# Patient Record
Sex: FEMALE | Race: WHITE | Employment: FULL TIME | URBAN - METROPOLITAN AREA
[De-identification: names, ages, dates, MRNs, and addresses within clinical notes are randomized per-mention and may not be internally consistent; named-entity substitution may affect disease eponyms.]

---

## 2021-07-20 LAB — MAMMOGRAPHY, EXTERNAL: NORMAL

## 2021-12-16 ENCOUNTER — HOSPITAL ENCOUNTER (OUTPATIENT)
Dept: GENERAL RADIOLOGY | Age: 56
Discharge: HOME | End: 2021-12-16
Payer: COMMERCIAL

## 2021-12-16 DIAGNOSIS — J44.9 CHRONIC OBSTRUCTIVE PULMONARY DISEASE, UNSPECIFIED COPD TYPE (HCC): ICD-10-CM

## 2021-12-16 PROCEDURE — 71046 X-RAY EXAM CHEST 2 VIEWS: CPT

## 2022-01-21 ENCOUNTER — HOSPITAL ENCOUNTER (OUTPATIENT)
Dept: SLEEP MEDICINE | Age: 57
Discharge: HOME | End: 2022-01-21
Payer: COMMERCIAL

## 2022-01-21 PROCEDURE — 95806 SLEEP STUDY UNATT&RESP EFFT: CPT

## 2022-02-08 PROBLEM — G47.33 OSA (OBSTRUCTIVE SLEEP APNEA): Status: ACTIVE | Noted: 2022-02-08

## 2022-02-08 PROBLEM — G47.34 NOCTURNAL HYPOXEMIA: Status: ACTIVE | Noted: 2022-02-08

## 2022-02-08 PROBLEM — G47.10 HYPERSOMNIA: Status: ACTIVE | Noted: 2022-02-08

## 2022-02-08 PROBLEM — G47.8 NON-RESTORATIVE SLEEP: Status: ACTIVE | Noted: 2022-02-08

## 2022-03-18 PROBLEM — G47.10 HYPERSOMNIA: Status: ACTIVE | Noted: 2022-02-08

## 2022-03-19 PROBLEM — G47.34 NOCTURNAL HYPOXEMIA: Status: ACTIVE | Noted: 2022-02-08

## 2022-03-19 PROBLEM — G47.8 NON-RESTORATIVE SLEEP: Status: ACTIVE | Noted: 2022-02-08

## 2022-03-20 PROBLEM — G47.33 OSA (OBSTRUCTIVE SLEEP APNEA): Status: ACTIVE | Noted: 2022-02-08

## 2022-06-10 NOTE — PROGRESS NOTES
[address] [place] [address] Court, [address] [facility]  [phone]    Patient Name:  Emile Francisco  YOB: 1965    Pursuant to the emergency declaration under the [facility] waiver authority and the [employer] and The [employer] Act, this Virtual  Visit was conducted, with patient's consent, to reduce the patient's risk of exposure to COVID-19 and provide continuity of care for an established patient. Telehealth encounter is a billable service, with coverage as determined by the insurance carrier. Services were provided through a video synchronous discussion virtually to substitute for in-person clinic visit. Arnav Sheets is a 60 y.o. female who was seen by synchronous (real-time) audio-video technology on 6/14/2022. Consent:  She and/or her healthcare decision maker is aware that this patient-initiated Telehealth encounter is a billable service, with coverage as determined by her insurance carrier. She is aware that she may receive a bill and has provided verbal consent to proceed: Yes        Office Visit 6/14/2022    CHIEF COMPLAINT:    Chief Complaint   Patient presents with    Sleep Apnea       HISTORY OF PRESENT ILLNESS:  Patient is being seen today via virtual visit. Patient was diagnosed with overall mild sleep apnea with AHI 5.7/hr with desaturations to 84%. She was started on APAP 6-10 cm. She demonstrates 69% compliance on therapy with average nightly use 4 hrs 49 min. AHI is down to 1.4/hr. Some nights, she is able to wear the machine 8 hrs and feel more rested the next day. However, there are nights that she can only wear it for 2 hours and have to take it off because of tolerance. She is using the F30i mask. We discussed changing to a nasal mask and she would like this. She denies any problems with the pressure. Will provide sample nasal mask to try.     She notices that her breathing is better on nights she can use her CPAP all night. She is not using albuterol regularly and didn't notice any improvement with using it. Maite Oquendo, GILES, had ordered a TSH but this has not been done. I do not see it crossover to the new Epic. I will reorder this today. Past Medical History:   Diagnosis Date    Autoimmune disease ([address]     hepatitis    GERD (gastroesophageal reflux disease)     no meds - no problems for several years now    Murmur     as an infant - no problems since    Nausea & vomiting     SOB (shortness of breath)     seasonal allergy related - no asthma dx       Patient Active Problem List   Diagnosis    Hypersomnia    Nocturnal hypoxemia    Non-restorative sleep    LAMAR (obstructive sleep apnea)           Past Surgical History:   Procedure Laterality Date    OTHER SURGICAL HISTORY  7/2009    lipoma removed from back           Social History     Socioeconomic History    Marital status:      Spouse name: Not on file    Number of children: Not on file    Years of education: Not on file    Highest education level: Not on file   Occupational History    Not on file   Tobacco Use    Smoking status: Never Smoker    Smokeless tobacco: Never Used   Substance and Sexual Activity    Alcohol use: No    Drug use: No    Sexual activity: Not on file   Other Topics Concern    Not on file   Social History Narrative    Not on file     Social Determinants of Health     Financial Resource Strain:     Difficulty of Paying Living Expenses: Not on file   Food Insecurity:     Worried About Running Out of Food in the Last Year: Not on file    Sergio of Food in the Last Year: Not on file   Transportation Needs:     Lack of Transportation (Medical): Not on file    Lack of Transportation (Non-Medical):  Not on file   Physical Activity:     Days of Exercise per Week: Not on file    Minutes of Exercise per Session: Not on file   Stress:     Feeling of Stress : Not on file   Social Connections:     Frequency of Communication with Friends and Family: Not on file    Frequency of Social Gatherings with Friends and Family: Not on file    Attends [employer] Services: Not on file    Active Member of Clubs or Organizations: Not on file    Attends Club or Organization Meetings: Not on file    Marital Status: Not on file   Intimate Partner Violence:     Fear of Current or Ex-Partner: Not on file    Emotionally Abused: Not on file    Physically Abused: Not on file    Sexually Abused: Not on file   Housing Stability:     Unable to Pay for Housing in the Last Year: Not on file    Number of [place] in the Last Year: Not on file    Unstable Housing in the Last Year: Not on file         Family History   Problem Relation Age of Onset    Asthma Father     Stroke Mother         Juluis Slocumb Hypertension Mother     Diabetes Mother     Cancer Father         multiple myeloma - prostate         Allergies   Allergen Reactions    Penicillins Rash     allergic since child         Current Outpatient Medications   Medication Sig    estrogens, conjugated,-methylTESTOSTERone (ESTRATEST) 1.25-2.5 MG per tablet Take 1 tablet by mouth.  furosemide (LASIX) 20 MG tablet Take by mouth daily    albuterol sulfate  (90 Base) MCG/ACT inhaler Inhale 2 puffs into the lungs every 4 hours as needed (Patient not taking: Reported on 6/14/2022)     No current facility-administered medications for this visit. REVIEW OF SYSTEMS:   CONSTITUTIONAL:   There is no history of fever, chills, night sweats. CARDIAC:   No chest pain, pressure, discomfort, palpitations, orthopnea, murmurs, or edema. GI:   No dysphagia, heartburn reflux, nausea/vomiting, diarrhea, abdominal pain, or bleeding. NEURO:   There is no history of AMS, persistent headache, decreased level of consciousness, seizures, or motor or sensory deficits.     VIRTUAL EXAM  PHYSICAL EXAMINATION:  [ INSTRUCTIONS:  \"[x]\" Indicates a positive item  \"[]\" Indicates a negative item   Vital Signs: (As obtained by patient/caregiver at home)  There were no vitals taken for this visit. Constitutional: [x] Appears well-developed and well-nourished [x] No apparent distress      [] Abnormal     Mental status: [x] Alert and awake  [x] Oriented to person/place/time [x] Able to follow commands    [] Abnormal -     Eyes:   EOM    [x]  Normal    [] Abnormal -   Sclera  [x]  Normal    [] Abnormal -          Discharge [x]  None visible   [] Abnormal -    HENT: [x] Normocephalic, atraumatic  [] Abnormal -  [x] Mouth/Throat: Mucous membranes are moist    External Ears [x] Normal  [] Abnormal -    Neck: [x] No visualized mass [] Abnormal -     Pulmonary/Chest: [x] Respiratory effort normal   [x] No visualized signs of difficulty breathing or respiratory distress        [] Abnormal -      Musculoskeletal:   [x] Normal gait with no signs of ataxia         [x] Normal range of motion of neck        [] Abnormal -     Neurological:        [x] No Facial Asymmetry (Cranial nerve 7 motor function) (limited exam due to video visit)          [x] No gaze palsy        [] Abnormal -         Skin:        [x] No significant exanthematous lesions or discoloration noted on facial skin         [] Abnormal -            Psychiatric:       [x] Normal Affect [] Abnormal -       [x] No Hallucinations    Other pertinent observable physical exam findings:-      ASSESSMENT:  (Medical Decision Making)      Diagnosis Orders   1. LAMAR (obstructive sleep apnea) AHI controlled on therapy but patient having difficulty tolerating mask. Will trial nasal mask.     2. Edema, unspecified type - check TSH  TSH        PLAN:  Continue APAP settings  Sample nasal mask will be provided  Will reorder TSH- she will have this drawn Friday when she comes for the nasal mask pickup  Follow up in 4 months or sooner if needed   Orders Placed This Encounter Procedures    TSH     Standing Status:   Future     Standing Expiration Date:   6/14/2023     No orders of the defined types were placed in this encounter. Collaborating Physician: Dr. Nicole Velasquez      I spent at least 20 minutes with this established patient, and >50% of the time was spent counseling and/or coordinating care regarding manan.     ALINA Price - SULEMA  Electronically signed

## 2022-06-14 ENCOUNTER — TELEMEDICINE (OUTPATIENT)
Dept: SLEEP MEDICINE | Age: 57
End: 2022-06-14
Payer: COMMERCIAL

## 2022-06-14 DIAGNOSIS — G47.33 OSA (OBSTRUCTIVE SLEEP APNEA): Primary | ICD-10-CM

## 2022-06-14 DIAGNOSIS — R60.9 EDEMA, UNSPECIFIED TYPE: ICD-10-CM

## 2022-06-14 PROCEDURE — 99213 OFFICE O/P EST LOW 20 MIN: CPT | Performed by: NURSE PRACTITIONER

## 2022-06-14 ASSESSMENT — SLEEP AND FATIGUE QUESTIONNAIRES
HOW LIKELY ARE YOU TO NOD OFF OR FALL ASLEEP WHILE SITTING AND READING: 3
HOW LIKELY ARE YOU TO NOD OFF OR FALL ASLEEP WHILE SITTING INACTIVE IN A PUBLIC PLACE: 0
HOW LIKELY ARE YOU TO NOD OFF OR FALL ASLEEP IN A CAR, WHILE STOPPED FOR A FEW MINUTES IN TRAFFIC: 0
HOW LIKELY ARE YOU TO NOD OFF OR FALL ASLEEP WHILE WATCHING TV: 2
HOW LIKELY ARE YOU TO NOD OFF OR FALL ASLEEP WHEN YOU ARE A PASSENGER IN A CAR FOR AN HOUR WITHOUT A BREAK: 2
HOW LIKELY ARE YOU TO NOD OFF OR FALL ASLEEP WHILE LYING DOWN TO REST IN THE AFTERNOON WHEN CIRCUMSTANCES PERMIT: 2
HOW LIKELY ARE YOU TO NOD OFF OR FALL ASLEEP WHILE SITTING AND TALKING TO SOMEONE: 0
HOW LIKELY ARE YOU TO NOD OFF OR FALL ASLEEP WHILE SITTING QUIETLY AFTER LUNCH WITHOUT ALCOHOL: 2
ESS TOTAL SCORE: 11

## 2022-06-17 DIAGNOSIS — R60.9 EDEMA, UNSPECIFIED TYPE: ICD-10-CM

## 2022-06-17 LAB — TSH, 3RD GENERATION: 1.89 UIU/ML (ref 0.36–3.74)

## 2022-06-20 ENCOUNTER — TELEPHONE (OUTPATIENT)
Dept: SLEEP MEDICINE | Age: 57
End: 2022-06-20

## 2022-06-20 NOTE — TELEPHONE ENCOUNTER
----- Message from Vikki Brady, ALINA - CNP sent at 6/17/2022  4:06 PM EDT -----  Please let patient know TSH was normal.

## 2022-07-22 ENCOUNTER — OFFICE VISIT (OUTPATIENT)
Dept: PULMONOLOGY | Age: 57
End: 2022-07-22
Payer: COMMERCIAL

## 2022-07-22 VITALS
RESPIRATION RATE: 17 BRPM | BODY MASS INDEX: 29.94 KG/M2 | WEIGHT: 186.3 LBS | HEIGHT: 66 IN | TEMPERATURE: 97 F | SYSTOLIC BLOOD PRESSURE: 116 MMHG | OXYGEN SATURATION: 98 % | DIASTOLIC BLOOD PRESSURE: 72 MMHG | HEART RATE: 52 BPM

## 2022-07-22 DIAGNOSIS — G47.33 OSA ON CPAP: ICD-10-CM

## 2022-07-22 DIAGNOSIS — R00.1 BRADYCARDIA: ICD-10-CM

## 2022-07-22 DIAGNOSIS — Z99.89 OSA ON CPAP: ICD-10-CM

## 2022-07-22 DIAGNOSIS — R60.0 LOWER EXTREMITY EDEMA: ICD-10-CM

## 2022-07-22 DIAGNOSIS — R06.2 WHEEZING: Primary | ICD-10-CM

## 2022-07-22 PROCEDURE — 99214 OFFICE O/P EST MOD 30 MIN: CPT | Performed by: NURSE PRACTITIONER

## 2022-07-22 NOTE — PROGRESS NOTES
[address], [address] [place] [address]   [phone]      Patient Name:  Royce Francisco      YOB: 1965      Office Visit 3/23/2022      HISTORY OF PRESENT ILLNESS:     Ms. Lola Slaughter in our clinic today who presents with a chief complaint of   Chief Complaint   Patient presents with    Wheezing          Josemanuel Hussein is a 59 yo female that presents to [facility] as a new patient for concerns of wheezing and COPD. She states her OBGYN sent her for CXR due to her wheezing and  the radiologist dictated concerns for COPD, so she was sent for evaluation. She is a lifetime non smoker. She denies any issues with her lungs until the last few years in which she has started wheezing. She states she noticed the wheezing when she notices that her legs are more swollen. If she takes a Lasix tablet, swelling  and wheezing seem to ease up. She has never been given an inhaler for the wheezing. As for the swelling. She states she started noticing this about 10 to 12 years ago. She was at her GYN office and it was noted that she had significant edema in her lower extremities. Her GYN ordered her Lasix and referred her to cardiology for evaluation. She had a stress test, echocardiogram and wore an event monitor. Her stress test was normal.  Her echocardiogram showed no significant valvular dysfunction, but did notice some mild left ventricular hypertrophy. Her EF was normal.  Her event monitor  did show some sinus bradycardia, heart rate in the 30s to 50s with intermittent PSVT as well. She denies any significant history of hypertension. Patient states she does have a history of autoimmune   Hepatitis. She was on Imuran for about 9 years, but this was recently stopped by Dr. Love Summers. Patient mentions that she has had significant weight fluctuations because of her swelling.   She is also told by her wife that she has significant snoring. She states her wife has never told her that she quits breathing when she sleeps. She states she  does not feel well rested and she does have some significant daytime hypersomnolence. She mentions that she had a sleep study about 10 to 12 years ago, but was never given results of this and never followed up. Patient has a significant family history for lung disorders   With her mother and father. They were both significant smokers. Father had prostate cancer, multiple myeloma and asthma as a young child. Her mother had lung cancer, COPD, heart failure and arrhythmias. She does have a cat. ASSESSMENT AND PLAN:  (Medical Decision Making)      Diagnoses and all orders for this visit:      1. Wheezing   --CPFT's with very mild airway obstruction noted. No significant response to albuterol. -- Improved wheezing and shortness of breath since being on CPAP as well as as needed Lasix. --pt has been very bradycardic as well; seeing cardiology and potential plans for pacemaker, but currently being monitored. 2. Obstructive sleep apnea   --AHI on home sleep testing 5.7. She is currently using his CPAP, although not consistent yet. She states she is trying to get used to this. Notices benefit when she wears it for several hours. 3. Edema of both lower extremities   --Patient can continue the Lasix is currently taking and prescribed. --TSH normal      4. Bradycardia  --HR during exam in 40's. Recent echo normal.  With fatigue, edema issues, pt encouraged to talk with cardiologist again regarding potential for pacemaker vs medication that could increase HR. At this point no ongoing concerns with her pulmonary status. With very mild obstruction noted on her spirometry, suspect she may have some mild reactive airway disease, so if seasonal or environmental allergies seem to peak, patient to use albuterol inhaler if needed.   If consistently using, she should make an appointment and follow back up with us. Otherwise she can call us as needed        Logan Zhou NP   Electronically signed      AD     Collaborating physician is Ailson Mcduffie MD        _________________________________________________________________________            REVIEW OF SYSTEMS:   10 point review of systems is negative except as reported in HPI. PHYSICAL EXAM:  Vitals:    07/22/22 0844   BP: 116/72   Pulse: 52   Resp: 17   Temp: 97 °F (36.1 °C)   TempSrc: Skin   SpO2: 98%   Weight: 186 lb 4.8 oz (84.5 kg)   Height: 5' 6\" (1.676 m)          GENERAL APPEARANCE:  The patient is obese and in no respiratory distress. HEENT:  PERRL. Conjunctivae unremarkable. Nasal mucosa is without epistaxis, exudate, or polyps. There is oropharyngeal narrowing. TMs  are clear. NECK/LYMPHATIC:  Symmetrical with no JVD. Trachea midline. No thyroid enlargement. No cervical adenopathy. LUNGS:  Normal respiratory effort with symmetrical lung expansion. Breath sounds clear bilaterally on RA. HEART:  There is a regular rate and rhythm. + murmur, no rub, or gallop. There is 1+ edema in the lower extremities. ABDOMEN:  Soft and non-tender. No hepatosplenomegaly. Bowel sounds are normal.     SKIN:  There are no rashes, cyanosis, jaundice, or ecchymosis present. EXTREMITIES:  The extremities are unremarkable without clubbing, cyanosis, joint inflammation, degenerative, or ischemic change. Hands with  poor cap refill and cool to the touch. MUSCULOSKELETAL:  There is no abnormal tone, muscle atrophy, or abnormal movement present. NEURO:  The patient is alert and oriented. Memory appears intact and mood is normal.  No gross sensorimotor deficits are present.          DIAGNOSTIC TESTS:                                                                                                              LABS: No results for input(s): HGB, HCT, TSH, BNPNT, HGBEXT, HCTEXT, TSHEXT, HGBEXT, HCTEXT, TSHEXT in the last 72 hours. CXR PA and lateral:  Results for orders placed during the hospital encounter of 03/17/15      XR CHEST PA LAT: 12/16/2021                   Exercise oximetry: Resting room air sat was 99% with heart rate of 56. During exertion, lowest oxygen saturation was 97% with highest heart rate at 73. No supplemental oxygen was required during this walk. Spirometry:           Date:     12/16/2021  3/4/22       FVC     2.81-79%  3.06-90%       FEV1     2.30-83%  2.25-90%       FEV1/FVC     82%  73%       FEF 25-75%     2.39-95%  1.71-66%       Bronchodilator Response       none       TLC       5.08-95%       RV       2.02-98%            DLCO       89%          Echo:2/11/2022   Narrative   Performed by [facility]   · The left ventricular systolic function is normal (55-65%). · Normal left ventricular diastolic function. · The right ventricular systolic function is normal.      Left Ventricle   The systolic function is normal (55-65%). Normal left ventricular diastolic function. Right Ventricle   Normal right ventricular cavity size. Normal right ventricular systolic function. RV tissue doppler velocity is  12.3 cm/s. RVSP: 20 mmHg. Left Atrium   The left atrium is normal in size. Right Atrium   The right atrium is normal in size. IVC/SVC   The Inferior Vena Cava is normal in size. Mitral Valve    The mitral valve is normal. No mitral valve stenosis. No mitral valve regurgitation. Tricuspid Valve   The tricuspid valve is normal. No tricuspid valve stenosis. No tricuspid valve regurgitation. Aortic Valve   The aortic  valve is normal. No aortic valve stenosis. No aortic valve regurgitation. Pulmonic Valve   The pulmonic valve is normal. No pulmonic valve stenosis. No pulmonic valve regurgitation. Pericardium   There is no pericardial effusion  present.           [facility] Reference Info: Exposure  History:   Second Hand Smoke Exposure: YES   Birds: NO   Asbestos: NO   TB: NO   Hot Tubs/Humidifier: NO   Organic/Inorganic Dusts: NO   Molds: NO   Occupation/Hobbies: retired teacher       Past Medical History:        Diagnosis  Date           Autoimmune disease ([address]            hepatitis           GERD (gastroesophageal reflux disease)            no meds - no problems for several years now           Murmur            as an infant - no problems since           Nausea & vomiting         SOB (shortness of breath)            seasonal allergy related - no asthma dx                 Tobacco Use: Low Risk           Smoking Tobacco Use: Never Smoker          Smokeless Tobacco Use: Never Used            Allergies        Allergen  Reactions           Pcn [Penicillins]  Rash             allergic since child            Current Outpatient Medications        Medication  Sig           albuterol (PROVENTIL HFA, VENTOLIN HFA, PROAIR HFA) 90 mcg/actuation inhaler  Take 2 Puffs by inhalation every four (4) hours as needed for Wheezing. furosemide (Lasix) 20 mg tablet  Take  by mouth daily. PRN for swelling       estrogens, conjugated,-methylTESTOSTERone (ESTRATEST) 1.25-2.5 mg per tablet  Take 1 Tab by mouth. naproxen (NAPROSYN) 500 mg tablet  Take 1 Tab by mouth two (2) times daily (with meals). As needed for knee pain (Patient not taking: Reported on 12/16/2021)            No current facility-administered medications for this visit.

## 2022-11-30 NOTE — PROGRESS NOTES
[address] [place] [address] Court, [address] [facility]  [phone]    Patient Name:  Claudeen Dials Foxx  YOB: 1965    Pursuant to the emergency declaration under the [address] [place] [address] waiver authority and the [employer] and [employer] Act, this Virtual  Visit was conducted, with patient's consent, to reduce the patient's risk of exposure to COVID-19 and provide continuity of care for an established patient. Telehealth encounter is a billable service, with coverage as determined by the insurance carrier. Services were provided through a video synchronous discussion virtually to substitute for in-person clinic visit. Kia Jefferson is a 59 y.o. female who was seen by synchronous (real-time) audio-video technology on 12/1/2022. Consent:  She and/or her healthcare decision maker is aware that this patient-initiated Telehealth encounter is a billable service, with coverage as determined by her insurance carrier. She is aware that she may receive a bill and has provided verbal consent to proceed: Yes        Office Visit 12/1/2022    CHIEF COMPLAINT:    Chief Complaint   Patient presents with    Sleep Apnea         HISTORY OF PRESENT ILLNESS:  Patient is being seen today via virtual visit. Patient had a home sleep test 1/21/22 with AHI 5.7/hr with desaturations to 84%. She is prescribed APAP 6-10 cm using a nasal mask. Download reveals 59% compliance over the past 169 days with average nightly use 4 hrs 30 min on days used. AHI is 1.5/hr on therapy. She notices a difference in sleep quality, more dreaming, and feeling more rested on days she can use her machine most of the night. There are many nights she feels \"tangled\" in the hose and takes it off early. She denies RLS symptoms but states that she turns to get comfortable in the night.  She is using the F&P May mask and this will be changed to Resmed N30i to keep the hose out of the way. She denies any significant weight changes with a current weight between 184-188 lbs. She had some edema this morning and took a dose of lasix. She reports only rarely requiring it. She hasn't had a follow up with cardiology yet to discuss bradycardia but will soon.                         Sleep Medicine 12/1/2022 6/14/2022   Sitting and reading 2 3   Watching TV 2 2   Sitting, inactive in a public place (e.g. a theatre or a meeting) 0 0   As a passenger in a car for an hour without a break 2 2   Lying down to rest in the afternoon when circumstances permit 2 2   Sitting and talking to someone 0 0   Sitting quietly after a lunch without alcohol 2 2   In a car, while stopped for a few minutes in traffic 0 0   Sidney Sleepiness Score 10 11         Past Medical History:   Diagnosis Date    Autoimmune disease ([facility] [address]     hepatitis    GERD (gastroesophageal reflux disease)     no meds - no problems for several years now    Murmur     as an infant - no problems since    Nausea & vomiting     SOB (shortness of breath)     seasonal allergy related - no asthma dx       Patient Active Problem List   Diagnosis    Hypersomnia    Nocturnal hypoxemia    LAMAR (obstructive sleep apnea)           Past Surgical History:   Procedure Laterality Date    OTHER SURGICAL HISTORY  7/2009    lipoma removed from back           Social History     Socioeconomic History    Marital status:      Spouse name: Not on file    Number of children: Not on file    Years of education: Not on file    Highest education level: Not on file   Occupational History    Not on file   Tobacco Use    Smoking status: Never    Smokeless tobacco: Never   Substance and Sexual Activity    Alcohol use: No    Drug use: No    Sexual activity: Not on file   Other Topics Concern    Not on file   Social History Narrative    Not on file     Social Determinants of Health     Financial Resource Strain: Not on file   Food Insecurity: Not on file   Transportation Needs: Not on file   Physical Activity: Not on file   Stress: Not on file   Social Connections: Not on file   Intimate Partner Violence: Not on file   Housing Stability: Not on file         Family History   Problem Relation Age of Onset    Asthma Father     Stroke Mother         TIA's    Hypertension Mother     Diabetes Mother     Cancer Father         multiple myeloma - prostate         Allergies   Allergen Reactions    Penicillins Rash     allergic since child         Current Outpatient Medications   Medication Sig    CPAP Machine MISC by Does not apply route    albuterol sulfate  (90 Base) MCG/ACT inhaler Inhale 2 puffs into the lungs every 4 hours as needed    estrogens, conjugated,-methylTESTOSTERone (ESTRATEST) 1.25-2.5 MG per tablet Take 1 tablet by mouth. furosemide (LASIX) 20 MG tablet Take by mouth daily     No current facility-administered medications for this visit. REVIEW OF SYSTEMS:   CONSTITUTIONAL:   There is no history of fever, chills, night sweats. CARDIAC:   No chest pain, pressure, discomfort, palpitations, orthopnea, murmurs. + intermittent edema. GI:   No dysphagia, heartburn reflux, nausea/vomiting, diarrhea, abdominal pain, or bleeding. NEURO:   There is no history of AMS, persistent headache, decreased level of consciousness, seizures, or motor or sensory deficits. VIRTUAL EXAM  PHYSICAL EXAMINATION:  [ INSTRUCTIONS:  \"[x]\" Indicates a positive item  \"[]\" Indicates a negative item   Vital Signs: (As obtained by patient/caregiver at home)  There were no vitals taken for this visit.      Constitutional: [x] Appears well-developed and well-nourished [x] No apparent distress      [] Abnormal     Mental status: [x] Alert and awake  [x] Oriented to person/place/time [x] Able to follow commands    [] Abnormal -     Eyes:   EOM    [x]  Normal    [] Abnormal -   Sclera  [x]  Normal    [] Abnormal -          Discharge [x]  None visible   [] Abnormal -    HENT: [x] Normocephalic, atraumatic  [] Abnormal -  [x] Mouth/Throat: Mucous membranes are moist    External Ears [x] Normal  [] Abnormal -    Neck: [x] No visualized mass [] Abnormal -     Pulmonary/Chest: [x] Respiratory effort normal   [x] No visualized signs of difficulty breathing or respiratory distress        [] Abnormal -      Musculoskeletal:   [x] Normal gait with no signs of ataxia         [x] Normal range of motion of neck        [] Abnormal -     Neurological:        [x] No Facial Asymmetry (Cranial nerve 7 motor function) (limited exam due to video visit)          [x] No gaze palsy        [] Abnormal -         Skin:        [x] No significant exanthematous lesions or discoloration noted on facial skin         [] Abnormal -            Psychiatric:       [x] Normal Affect [] Abnormal -       [x] No Hallucinations    Other pertinent observable physical exam findings:-      ASSESSMENT:  (Medical Decision Making)      Diagnosis Orders   1. LAMAR (obstructive sleep apnea)  DME - DURABLE MEDICAL EQUIPMENT   AHI well controlled on current auto cpap settings with AHI down to 1.5/hr. will change mask to help with tolerance. 2. Nocturnal hypoxemia  DME - DURABLE MEDICAL EQUIPMENT   Improved on pap therapy    3. Hypersomnia  DME - DURABLE MEDICAL EQUIPMENT   Notes improvement with this and restorative sleep when able to use CPAP all night         PLAN:  Continue APAP at current settings.   Change mask to N30i sample pack  Renew supplies  Follow up will be in 6 months or sooner if needed     Orders Placed This Encounter   Procedures    DME - C/ [address]  Change mask to Resmed N30i sample pack, please provide to patient    [address] [place]  Phone: [address] [address]  Dept: [phone]      Patient Name: Maria Esther Francisco  : 1965  Gender: female  Address: [address] [place] [id number]   Patient phone: [phone] (home)       Primary Insurance: Payor: Ronny Taylork / Plan: Jack Layton / Product Type: *No Product type* /   Subscriber ID: [id number] - (Zaira KIRK)      AMB Supply Order  Order Details     DME Location:   Order Date: 12/1/2022   There were no encounter diagnoses. (  X   )Supplies Needed       apap Machine   (     ) CPAP Unit  (     ) Auto CPAP Unit  (     ) BiLevel Unit  (     ) Auto BiLevel Unit  (     ) ASV        (     ) Bilevel ST      Length of need: 12 months    Pressure:  6-10 cmH20  EPR:     Starting Ramp Pressure:   cm H20  Ramp Time: min        Patient had a diagnostic Apnea Hypopnea Index (AHI) of :    *SUPPLIES* Replace all as needed, or per coverage guidelines     Masks Type:  (    ) -Full Face Mask (1 per 3 mon)  (    ) -Full Mask (1 per month) Interface/Cushion      ( x ) -Nasal Mask (1 per 3 mon)  ( x  ) - Nasal Mask (1 per month) Interface/Cushion  (  x   ) -Pillow (2 per mon)  (     ) -Kazspufio (1 per 6 mon)            Other Supplies:    (   X  )-Qubcsklu (1 per 6 mon)  (   X  )-Ouwhco Tubing (1 per 3 mon)  (   X  )- Disposable Filter (2 per mon)  (   x  )-Nvyppm Humidifier (1 per year)     (  x   )-Klbjxufod (sometimes used with Full Face Mask) (1 per 6 mos)  (    )-Tubing-without heat (1 per 3 mos)  (     )-Non-Disposable Filter (1 per 6 mos)  (  x   )-Water Chamber (1 per 6 mos)  (     )-Humidifier non-heated (1 per 5 yrs)      Signed Date: 12/1/2022  Electronically Signed By: ALINA Eubanks CNP  Electronically Dated:  12/1/2022       No orders of the defined types were placed in this encounter. Collaborating Physician: Dr. Sandy Avendaño I spent at least 20 minutes with this established patient, and >50% of the time was spent counseling and/or coordinating care regarding manan.     ALINA Eubanks - CNP  Electronically signed

## 2022-12-01 ENCOUNTER — TELEMEDICINE (OUTPATIENT)
Dept: SLEEP MEDICINE | Age: 57
End: 2022-12-01
Payer: COMMERCIAL

## 2022-12-01 DIAGNOSIS — G47.10 HYPERSOMNIA: ICD-10-CM

## 2022-12-01 DIAGNOSIS — G47.34 NOCTURNAL HYPOXEMIA: ICD-10-CM

## 2022-12-01 DIAGNOSIS — G47.33 OSA (OBSTRUCTIVE SLEEP APNEA): Primary | ICD-10-CM

## 2022-12-01 PROBLEM — G47.8 NON-RESTORATIVE SLEEP: Status: RESOLVED | Noted: 2022-02-08 | Resolved: 2022-12-01

## 2022-12-01 PROCEDURE — 99213 OFFICE O/P EST LOW 20 MIN: CPT | Performed by: NURSE PRACTITIONER

## 2022-12-01 ASSESSMENT — SLEEP AND FATIGUE QUESTIONNAIRES
HOW LIKELY ARE YOU TO NOD OFF OR FALL ASLEEP WHILE SITTING QUIETLY AFTER LUNCH WITHOUT ALCOHOL: 2
ESS TOTAL SCORE: 10
HOW LIKELY ARE YOU TO NOD OFF OR FALL ASLEEP WHILE WATCHING TV: 2
HOW LIKELY ARE YOU TO NOD OFF OR FALL ASLEEP WHILE SITTING INACTIVE IN A PUBLIC PLACE: 0
HOW LIKELY ARE YOU TO NOD OFF OR FALL ASLEEP WHILE SITTING AND READING: 2
HOW LIKELY ARE YOU TO NOD OFF OR FALL ASLEEP WHILE SITTING AND TALKING TO SOMEONE: 0
HOW LIKELY ARE YOU TO NOD OFF OR FALL ASLEEP WHILE LYING DOWN TO REST IN THE AFTERNOON WHEN CIRCUMSTANCES PERMIT: 2
HOW LIKELY ARE YOU TO NOD OFF OR FALL ASLEEP IN A CAR, WHILE STOPPED FOR A FEW MINUTES IN TRAFFIC: 0
HOW LIKELY ARE YOU TO NOD OFF OR FALL ASLEEP WHEN YOU ARE A PASSENGER IN A CAR FOR AN HOUR WITHOUT A BREAK: 2

## 2023-02-07 ENCOUNTER — RX ONLY (OUTPATIENT)
Age: 58
Setting detail: RX ONLY
End: 2023-02-07

## 2023-04-13 ENCOUNTER — APPOINTMENT (RX ONLY)
Dept: URBAN - METROPOLITAN AREA CLINIC 24 | Facility: CLINIC | Age: 58
Setting detail: DERMATOLOGY
End: 2023-04-13

## 2023-04-13 DIAGNOSIS — D485 NEOPLASM OF UNCERTAIN BEHAVIOR OF SKIN: ICD-10-CM

## 2023-04-13 DIAGNOSIS — L71.8 OTHER ROSACEA: ICD-10-CM | Status: INADEQUATELY CONTROLLED

## 2023-04-13 DIAGNOSIS — D69.2 OTHER NONTHROMBOCYTOPENIC PURPURA: ICD-10-CM

## 2023-04-13 DIAGNOSIS — L57.0 ACTINIC KERATOSIS: ICD-10-CM

## 2023-04-13 DIAGNOSIS — L72.8 OTHER FOLLICULAR CYSTS OF THE SKIN AND SUBCUTANEOUS TISSUE: ICD-10-CM

## 2023-04-13 DIAGNOSIS — L71.1 RHINOPHYMA: ICD-10-CM

## 2023-04-13 DIAGNOSIS — L57.8 OTHER SKIN CHANGES DUE TO CHRONIC EXPOSURE TO NONIONIZING RADIATION: ICD-10-CM | Status: INADEQUATELY CONTROLLED

## 2023-04-13 DIAGNOSIS — L82.1 OTHER SEBORRHEIC KERATOSIS: ICD-10-CM

## 2023-04-13 DIAGNOSIS — Z71.89 OTHER SPECIFIED COUNSELING: ICD-10-CM

## 2023-04-13 PROBLEM — D48.5 NEOPLASM OF UNCERTAIN BEHAVIOR OF SKIN: Status: ACTIVE | Noted: 2023-04-13

## 2023-04-13 PROCEDURE — 17003 DESTRUCT PREMALG LES 2-14: CPT

## 2023-04-13 PROCEDURE — ? COUNSELING

## 2023-04-13 PROCEDURE — 99204 OFFICE O/P NEW MOD 45 MIN: CPT | Mod: 25

## 2023-04-13 PROCEDURE — ? LIQUID NITROGEN

## 2023-04-13 PROCEDURE — ? PRESCRIPTION

## 2023-04-13 PROCEDURE — ? PRESCRIPTION MEDICATION MANAGEMENT

## 2023-04-13 PROCEDURE — ? BIOPSY BY SHAVE METHOD

## 2023-04-13 PROCEDURE — 11102 TANGNTL BX SKIN SINGLE LES: CPT

## 2023-04-13 PROCEDURE — 17000 DESTRUCT PREMALG LESION: CPT | Mod: 59

## 2023-04-13 RX ORDER — IVERMECTIN 10 MG/G
CREAM TOPICAL
Qty: 45 | Refills: 3 | Status: ERX | COMMUNITY
Start: 2023-04-13

## 2023-04-13 RX ORDER — DOXYCYCLINE HYCLATE 100 MG/1
CAPSULE, GELATIN COATED ORAL
Qty: 30 | Refills: 2 | Status: ERX | COMMUNITY
Start: 2023-04-13

## 2023-04-13 RX ADMIN — DOXYCYCLINE HYCLATE: 100 CAPSULE, GELATIN COATED ORAL at 00:00

## 2023-04-13 RX ADMIN — IVERMECTIN: 10 CREAM TOPICAL at 00:00

## 2023-04-13 ASSESSMENT — LOCATION ZONE DERM
LOCATION ZONE: ARM
LOCATION ZONE: SCALP
LOCATION ZONE: HAND
LOCATION ZONE: NOSE
LOCATION ZONE: LEG
LOCATION ZONE: FACE

## 2023-04-13 ASSESSMENT — LOCATION DETAILED DESCRIPTION DERM
LOCATION DETAILED: LEFT RADIAL DORSAL HAND
LOCATION DETAILED: LEFT PROXIMAL DORSAL FOREARM
LOCATION DETAILED: LEFT ANTERIOR DISTAL THIGH
LOCATION DETAILED: LEFT POSTERIOR SHOULDER
LOCATION DETAILED: LEFT CENTRAL PARIETAL SCALP
LOCATION DETAILED: RIGHT DISTAL DORSAL FOREARM
LOCATION DETAILED: RIGHT POSTERIOR SHOULDER
LOCATION DETAILED: LEFT INFERIOR CENTRAL MALAR CHEEK
LOCATION DETAILED: RIGHT ANTERIOR DISTAL THIGH
LOCATION DETAILED: NASAL SUPRATIP
LOCATION DETAILED: RIGHT CENTRAL MALAR CHEEK

## 2023-04-13 ASSESSMENT — LOCATION SIMPLE DESCRIPTION DERM
LOCATION SIMPLE: RIGHT THIGH
LOCATION SIMPLE: RIGHT SHOULDER
LOCATION SIMPLE: LEFT THIGH
LOCATION SIMPLE: RIGHT CHEEK
LOCATION SIMPLE: LEFT CHEEK
LOCATION SIMPLE: LEFT SHOULDER
LOCATION SIMPLE: LEFT HAND
LOCATION SIMPLE: NOSE
LOCATION SIMPLE: LEFT FOREARM
LOCATION SIMPLE: RIGHT FOREARM
LOCATION SIMPLE: SCALP

## 2023-04-13 NOTE — PROCEDURE: PRESCRIPTION MEDICATION MANAGEMENT
Detail Level: Zone
Plan: Start Doxycyline daily and soolantra qd
Render In Strict Bullet Format?: No

## 2023-04-13 NOTE — PROCEDURE: BIOPSY BY SHAVE METHOD
Detail Level: Detailed
Depth Of Biopsy: dermis
Was A Bandage Applied: Yes
Size Of Lesion In Cm: 0
Biopsy Type: H and E
Biopsy Method: double edge Personna blade
Anesthesia Type: 1% lidocaine without epinephrine and a 1:10 solution of 8.4% sodium bicarbonate
Anesthesia Volume In Cc: 0.5
Hemostasis: Drysol
Wound Care: Petrolatum
Dressing: bandage
Destruction After The Procedure: No
Type Of Destruction Used: Curettage
Curettage Text: The wound bed was treated with curettage after the biopsy was performed.
Cryotherapy Text: The wound bed was treated with cryotherapy after the biopsy was performed.
Electrodesiccation Text: The wound bed was treated with electrodesiccation after the biopsy was performed.
Electrodesiccation And Curettage Text: The wound bed was treated with electrodesiccation and curettage after the biopsy was performed.
Silver Nitrate Text: The wound bed was treated with silver nitrate after the biopsy was performed.
Lab: [id number]
Lab Facility: [id number]
Consent: Written consent was obtained and risks were reviewed including but not limited to scarring, infection, bleeding, scabbing, incomplete removal, nerve damage and allergy to anesthesia.
Post-Care Instructions: I reviewed with the patient in detail post-care instructions. Patient is to keep the biopsy site dry overnight, and then apply bacitracin twice daily until healed. Patient may apply hydrogen peroxide soaks to remove any crusting.
Notification Instructions: Patient will be notified of biopsy results. However, patient instructed to call the office if not contacted within 2 weeks.
Billing Type: Third-Party Bill
Information: Selecting Yes will display possible errors in your note based on the variables you have selected. This validation is only offered as a suggestion for you. PLEASE NOTE THAT THE VALIDATION TEXT WILL BE REMOVED WHEN YOU FINALIZE YOUR NOTE. IF YOU WANT TO FAX A PRELIMINARY NOTE YOU WILL NEED TO TOGGLE THIS TO 'NO' IF YOU DO NOT WANT IT IN YOUR FAXED NOTE.

## 2023-04-13 NOTE — PROCEDURE: LIQUID NITROGEN
Detail Level: Detailed
Number Of Freeze-Thaw Cycles: 2 freeze-thaw cycles
Render Post-Care Instructions In Note?: no
Duration Of Freeze Thaw-Cycle (Seconds): 0
Post-Care Instructions: I reviewed with the patient in detail post-care instructions. Patient is to wear sunprotection, and avoid picking at any of the treated lesions. Pt may apply Vaseline to crusted or scabbing areas.
Application Tool (Optional): Liquid Nitrogen Sprayer
Show Applicator Variable?: Yes
Aperture Size (Optional): C
Consent: The patient's consent was obtained including but not limited to risks of crusting, scabbing, blistering, scarring, darker or lighter pigmentary change, recurrence, incomplete removal and infection.

## 2023-05-18 ENCOUNTER — APPOINTMENT (RX ONLY)
Dept: URBAN - METROPOLITAN AREA CLINIC 24 | Facility: CLINIC | Age: 58
Setting detail: DERMATOLOGY
End: 2023-05-18

## 2023-05-18 DIAGNOSIS — B07.8 OTHER VIRAL WARTS: ICD-10-CM

## 2023-05-18 DIAGNOSIS — L57.8 OTHER SKIN CHANGES DUE TO CHRONIC EXPOSURE TO NONIONIZING RADIATION: ICD-10-CM | Status: INADEQUATELY CONTROLLED

## 2023-05-18 DIAGNOSIS — L57.0 ACTINIC KERATOSIS: ICD-10-CM

## 2023-05-18 DIAGNOSIS — L82.1 OTHER SEBORRHEIC KERATOSIS: ICD-10-CM

## 2023-05-18 PROCEDURE — ? COUNSELING

## 2023-05-18 PROCEDURE — 99213 OFFICE O/P EST LOW 20 MIN: CPT | Mod: 25

## 2023-05-18 PROCEDURE — ? LIQUID NITROGEN

## 2023-05-18 PROCEDURE — 17110 DESTRUCTION B9 LES UP TO 14: CPT

## 2023-05-18 PROCEDURE — 17000 DESTRUCT PREMALG LESION: CPT | Mod: 59

## 2023-05-18 ASSESSMENT — LOCATION ZONE DERM
LOCATION ZONE: LEG
LOCATION ZONE: ARM

## 2023-05-18 ASSESSMENT — LOCATION SIMPLE DESCRIPTION DERM
LOCATION SIMPLE: LEFT THIGH
LOCATION SIMPLE: LEFT UPPER ARM
LOCATION SIMPLE: RIGHT THIGH
LOCATION SIMPLE: LEFT KNEE

## 2023-05-18 ASSESSMENT — LOCATION DETAILED DESCRIPTION DERM
LOCATION DETAILED: RIGHT ANTERIOR DISTAL THIGH
LOCATION DETAILED: LEFT ANTERIOR DISTAL THIGH
LOCATION DETAILED: RIGHT ANTERIOR MEDIAL DISTAL THIGH
LOCATION DETAILED: LEFT MEDIAL KNEE
LOCATION DETAILED: LEFT ANTERIOR PROXIMAL UPPER ARM

## 2023-05-18 NOTE — PROCEDURE: LIQUID NITROGEN
Aperture Size (Optional): C
Show Applicator Variable?: Yes
Detail Level: Detailed
Render Post-Care Instructions In Note?: no
Post-Care Instructions: I reviewed with the patient in detail post-care instructions. Patient is to wear sunprotection, and avoid picking at any of the treated lesions. Pt may apply Vaseline to crusted or scabbing areas.
Duration Of Freeze Thaw-Cycle (Seconds): 0
Number Of Freeze-Thaw Cycles: 2 freeze-thaw cycles
Application Tool (Optional): Liquid Nitrogen Sprayer
Consent: The patient's consent was obtained including but not limited to risks of crusting, scabbing, blistering, scarring, darker or lighter pigmentary change, recurrence, incomplete removal and infection.
Number Of Freeze-Thaw Cycles: 3 freeze-thaw cycles
Spray Paint Text: The liquid nitrogen was applied to the skin utilizing a spray paint frosting technique.
Medical Necessity Information: It is in your best interest to select a reason for this procedure from the list below. All of these items fulfill various CMS LCD requirements except the new and changing color options.
Medical Necessity Clause: This procedure was medically necessary because the lesions that were treated were:
Application Tool (Optional): Cry-AC

## 2023-07-20 ENCOUNTER — APPOINTMENT (RX ONLY)
Dept: URBAN - METROPOLITAN AREA CLINIC 24 | Facility: CLINIC | Age: 58
Setting detail: DERMATOLOGY
End: 2023-07-20

## 2023-07-20 DIAGNOSIS — L82.0 INFLAMED SEBORRHEIC KERATOSIS: ICD-10-CM

## 2023-07-20 DIAGNOSIS — L71.8 OTHER ROSACEA: ICD-10-CM | Status: IMPROVED

## 2023-07-20 DIAGNOSIS — B07.8 OTHER VIRAL WARTS: ICD-10-CM

## 2023-07-20 PROCEDURE — ? COUNSELING

## 2023-07-20 PROCEDURE — 99213 OFFICE O/P EST LOW 20 MIN: CPT | Mod: 25

## 2023-07-20 PROCEDURE — ? LIQUID NITROGEN

## 2023-07-20 PROCEDURE — ? PRESCRIPTION MEDICATION MANAGEMENT

## 2023-07-20 PROCEDURE — 17110 DESTRUCTION B9 LES UP TO 14: CPT

## 2023-07-20 PROCEDURE — ? PRESCRIPTION

## 2023-07-20 RX ORDER — DOXYCYCLINE HYCLATE 50 MG/1
CAPSULE, GELATIN COATED ORAL
Qty: 60 | Refills: 4 | Status: ERX | COMMUNITY
Start: 2023-07-20

## 2023-07-20 RX ADMIN — DOXYCYCLINE HYCLATE: 50 CAPSULE, GELATIN COATED ORAL at 00:00

## 2023-07-20 ASSESSMENT — LOCATION ZONE DERM
LOCATION ZONE: ARM
LOCATION ZONE: FACE
LOCATION ZONE: LEG

## 2023-07-20 ASSESSMENT — LOCATION SIMPLE DESCRIPTION DERM
LOCATION SIMPLE: LEFT PRETIBIAL REGION
LOCATION SIMPLE: RIGHT CHEEK
LOCATION SIMPLE: LEFT CHEEK
LOCATION SIMPLE: RIGHT THIGH
LOCATION SIMPLE: LEFT FOREARM

## 2023-07-20 ASSESSMENT — LOCATION DETAILED DESCRIPTION DERM
LOCATION DETAILED: RIGHT INFERIOR CENTRAL MALAR CHEEK
LOCATION DETAILED: RIGHT CENTRAL MALAR CHEEK
LOCATION DETAILED: RIGHT ANTERIOR DISTAL THIGH
LOCATION DETAILED: LEFT INFERIOR CENTRAL MALAR CHEEK
LOCATION DETAILED: RIGHT ANTERIOR MEDIAL DISTAL THIGH
LOCATION DETAILED: LEFT PROXIMAL DORSAL FOREARM
LOCATION DETAILED: RIGHT ANTERIOR PROXIMAL THIGH
LOCATION DETAILED: LEFT DISTAL PRETIBIAL REGION

## 2023-07-20 NOTE — PROCEDURE: LIQUID NITROGEN
Show Applicator Variable?: Yes
Number Of Freeze-Thaw Cycles: 3 freeze-thaw cycles
Spray Paint Text: The liquid nitrogen was applied to the skin utilizing a spray paint frosting technique.
Add 52 Modifier (Optional): no
Medical Necessity Information: It is in your best interest to select a reason for this procedure from the list below. All of these items fulfill various CMS LCD requirements except the new and changing color options.
Medical Necessity Clause: This procedure was medically necessary because the lesions that were treated were: irritated with grooming, itching
Consent: The patient's consent was obtained including but not limited to risks of crusting, scabbing, blistering, scarring, darker or lighter pigmentary change, recurrence, incomplete removal and infection.
Aperture Size (Optional): C
Application Tool (Optional): Cry-AC
Post-Care Instructions: I reviewed with the patient in detail post-care instructions. Patient is to wear sunprotection, and avoid picking at any of the treated lesions. Pt may apply Vaseline to crusted or scabbing areas.
Detail Level: Detailed
Medical Necessity Clause: This procedure was medically necessary because the lesions that were treated were:

## 2023-07-20 NOTE — PROCEDURE: PRESCRIPTION MEDICATION MANAGEMENT
Initiate Treatment: Doxy 50mg capsule instead of 100mg. Patient's state plan will not cover Doxy 100mg anymore. 50mg is preferred.
Continue Regimen: Soolantra
Detail Level: Zone
Render In Strict Bullet Format?: No

## 2023-11-22 ENCOUNTER — OFFICE VISIT (OUTPATIENT)
Dept: ORTHOPEDIC SURGERY | Age: 58
End: 2023-11-22

## 2023-11-22 VITALS — BODY MASS INDEX: 28.72 KG/M2 | WEIGHT: 183 LBS | HEIGHT: 67 IN

## 2023-11-22 DIAGNOSIS — M54.50 LOW BACK PAIN, UNSPECIFIED BACK PAIN LATERALITY, UNSPECIFIED CHRONICITY, UNSPECIFIED WHETHER SCIATICA PRESENT: Primary | ICD-10-CM

## 2023-11-22 DIAGNOSIS — M47.816 FACET ARTHROPATHY, LUMBAR: ICD-10-CM

## 2023-11-22 DIAGNOSIS — M43.16 SPONDYLOLISTHESIS OF LUMBAR REGION: ICD-10-CM

## 2023-11-22 DIAGNOSIS — M51.36 DDD (DEGENERATIVE DISC DISEASE), LUMBAR: ICD-10-CM

## 2023-11-22 RX ORDER — DOXYCYCLINE HYCLATE 50 MG/1
CAPSULE ORAL
COMMUNITY
Start: 2023-08-30

## 2023-11-22 RX ORDER — HYDROCHLOROTHIAZIDE 12.5 MG/1
CAPSULE, GELATIN COATED ORAL DAILY
COMMUNITY
Start: 2023-11-01

## 2023-11-22 RX ORDER — ESTRADIOL 0.1 MG/G
CREAM VAGINAL
COMMUNITY
Start: 2023-11-10

## 2023-11-22 RX ORDER — DICLOFENAC SODIUM 75 MG/1
75 TABLET, DELAYED RELEASE ORAL 2 TIMES DAILY PRN
Qty: 60 TABLET | Refills: 0 | Status: SHIPPED | OUTPATIENT
Start: 2023-11-22

## 2023-11-22 RX ORDER — IVERMECTIN 10 MG/G
CREAM TOPICAL
COMMUNITY
Start: 2023-06-21

## 2023-11-22 RX ORDER — CETIRIZINE HYDROCHLORIDE 10 MG/1
10 TABLET ORAL
COMMUNITY

## 2023-11-22 RX ORDER — FLUTICASONE PROPIONATE 50 MCG
1 SPRAY, SUSPENSION (ML) NASAL 2 TIMES DAILY
COMMUNITY
Start: 2023-10-26 | End: 2023-11-23

## 2023-11-22 RX ORDER — TIZANIDINE 4 MG/1
2-4 TABLET ORAL EVERY 8 HOURS PRN
Qty: 24 TABLET | Refills: 0 | Status: SHIPPED | OUTPATIENT
Start: 2023-11-22

## 2023-11-22 RX ORDER — BENZONATATE 100 MG/1
CAPSULE ORAL
COMMUNITY
Start: 2023-10-26

## 2023-11-22 NOTE — PROGRESS NOTES
Name: Denita Francisco  YOB: 1965  Gender: female  MRN: [id number]    CC: Back Pain (Low back pain)       HPI: This is a 59y.o. year old female who has many year history of chronic lower back pain that seem to be low more prominent and more intense over the past several months. She typically notices it when she does yard work however lifting her grandkids lately is cause more pain and stays in the mid lower back. There is no radiation into the hips or legs. Is a constant ache but increased activity makes it worse. Denies numbness or tingling of the lower extremities denies weakness of the lower extremities. He can be quite uncomfortable at night. Pain can get up to 5 out of 10. This patient  has not had lumbar surgery in the past.                11/22/2023     9:32 AM   AMB PAIN ASSESSMENT   Location of Pain Back   Severity of Pain 6   Quality of Pain Aching   Duration of Pain Persistent   Frequency of Pain Intermittent   Date Pain First Started 6/13/1990   Aggravating Factors Other (Comment)    Limiting Behavior Some   Result of Injury No   Work-Related Injury No   Are there other pain locations you wish to document? No       Significant value              ROS/Meds/PSH/PMH/FH/SH: I personally reviewed the patient's collected intake data.   Below are the pertinents:    Allergies   Allergen Reactions    Penicillins Rash and Other (See Comments)     allergic since child    Penicillamine Other (See Comments)    Pollen Extract Other (See Comments)         Current Outpatient Medications:     benzonatate (TESSALON) 100 MG capsule, SWALLOW WHOLE TAKE 1 CAPSULE 3 TIMES A DAY AS NEEDED *DO NOT BREAK CHEW, DISSOLVE, CUT, OR CRUSH*, Disp: , Rfl:     cetirizine (ZYRTEC) 10 MG tablet, Take 1 tablet by mouth, Disp: , Rfl:     doxycycline (VIBRAMYCIN) 50 MG capsule, TAKE 2 CAPSULES BY MOUTH WITH DINNER DAILY AS NEEDED *DO NOT LIE DOWN 2 TO 3HRS AFTER TAKING, Disp: , Rfl:     estradiol (ESTRACE) 0.1 MG/GM

## 2023-11-22 NOTE — PATIENT INSTRUCTIONS
slip forward. The break most often occurs in the area of your lumbar spine called the pars interarticularis. In most cases of spondylolytic spondylolisthesis, the pars fracture occurs during adolescence and goes unnoticed until adulthood. The normal disk degeneration that occurs in adulthood can then stress the pars fracture and cause the vertebra to slip forward. This type of spondylolisthesis is most often seen in middle-aged men. (Left) In spondylolysis, a fracture often occurs at the pars interarticularis. (Right) Because of the pars fracture, only the front part of the bone slips forward. Because a pars fracture causes the front (vertebra) and back (lamina) parts of the spinal bone to disconnect, only the front part slips forward. This means that narrowing of the spinal canal is less likely than in other kinds of spondylolisthesis, such as DS in which the entire spinal bone slips forward. This x-ray taken from the side shows a pars fracture (arrow) and the resulting spondylolisthesis. Symptoms  About 4% to 6% of the [place] population has spondylolysis and spondylolisthesis. Most of these people live with the condition for many years without any pain or other symptoms. Symptoms of Degenerative Spondylolisthesis  Patients with DS often visit the doctor's office once the slippage has begun to put pressure on the spinal nerves. Although the doctor may find arthritis in the spine, the symptoms of DS are typically the same as symptoms of spinal stenosis. For example, DS patients often develop leg and/or lower back pain. The most common symptoms in the legs include a feeling of vague weakness associated with prolonged standing or walking. Leg symptoms may be accompanied by numbness, tingling, and/or pain that is often affected by posture. Forward bending or sitting often relieves the symptoms because it opens up space in the spinal canal. Standing or walking often increases symptoms.     Symptoms of

## 2023-11-28 ENCOUNTER — OFFICE VISIT (OUTPATIENT)
Dept: ORTHOPEDIC SURGERY | Age: 58
End: 2023-11-28

## 2023-11-28 VITALS — WEIGHT: 183 LBS | HEIGHT: 67 IN | BODY MASS INDEX: 28.72 KG/M2

## 2023-11-28 DIAGNOSIS — M79.642 BILATERAL HAND PAIN: Primary | ICD-10-CM

## 2023-11-28 DIAGNOSIS — M79.641 BILATERAL HAND PAIN: Primary | ICD-10-CM

## 2023-11-28 DIAGNOSIS — M18.0 PRIMARY ARTHROSIS OF FIRST CARPOMETACARPAL JOINTS, BILATERAL: ICD-10-CM

## 2023-11-28 NOTE — PROGRESS NOTES
The patient was prescribed and fitted with bilateral CMC controller plus thumb braces, size S/M. Patient read and signed documenting they understand and agree to [facility]'s current DME return policy.
splinting, cortisone injection(s) and surgical reconstruction with trapeziectomy and suspension arthroplasty and the risks and benefits of all were clearly outlined. We discussed the fact that the vast majority of thumb CMC arthritis causes persistent chronic pain and that surgical reconstruction is the endpoint for patients whose symptoms are not properly alleviated with conservative measures but the vast majority of patients end up having surgery. After discussing all options in detail the patient elects to proceed with NSAIDs, braces. Patient voiced accordance and understanding of the information provided and the formulated plan. All questions were answered to the patient's satisfaction during the encounter.         Marie Mosley MD  Orthopaedic Surgery  11/28/23  3:33 PM

## 2023-12-04 ENCOUNTER — HOSPITAL ENCOUNTER (OUTPATIENT)
Dept: PHYSICAL THERAPY | Age: 58
Setting detail: RECURRING SERIES
Discharge: HOME | End: 2023-12-07
Payer: COMMERCIAL

## 2023-12-04 PROCEDURE — 97110 THERAPEUTIC EXERCISES: CPT

## 2023-12-04 PROCEDURE — 97162 PT EVAL MOD COMPLEX 30 MIN: CPT

## 2023-12-04 ASSESSMENT — PAIN SCALES - GENERAL: PAINLEVEL_OUTOF10: 2

## 2023-12-06 NOTE — PROGRESS NOTES
Amanda Cruz Maryam  : 1965  Primary: [address] ([address] [facility])  Secondary:  [facility]  [address] [facility] [address]  Phone: [phone]  Fax: [phone] Plan Frequency: 2 times a week for 6 weeks    Plan of Care/Certification Expiration Date: 24 (start of care 23)      >PT Visit Info:  Plan Frequency: 2 times a week for 6 weeks  Plan of Care/Certification Expiration Date: 24 (start of care 23)      Visit Count:  1    OUTPATIENT PHYSICAL THERAPY:OP NOTE TYPE: Treatment Note 2023       Episode  }Appt Desk             Treatment Diagnosis:  Generalized Muscle Weakness (M62.81)  Difficulty in walking, Not elsewhere classified (R26.2)  Other Low Back Pain (M54.59)  Abnormal posture (R29.3)  Medical/Referring Diagnosis:  Low back pain, unspecified back pain laterality, unspecified chronicity, unspecified whether sciatica present [M54.50]  Spondylolisthesis of lumbar region [M43.16]  DDD (degenerative disc disease), lumbar [M51.36]  Facet arthropathy, lumbar [M70.226]  Referring Physician:  Pepper Abad MD Orders:  PT Eval and Treat   Date of Onset:  No data recorded   Allergies:   Penicillins, Penicillamine, and Pollen extract  Restrictions/Precautions:  No data recordedNo data recorded   Interventions Planned (Treatment may consist of any combination of the following):    No data recorded   >Subjective Comments: Patient reports that the pain is low right now. She can do what she needs to do though will experience low back pain afterward. >Initial:     2/10>Post Session:        (1-2)/10  Medications Last Reviewed:  2023  Updated Objective Findings:  See Evaluation Note from today  Treatment   THERAPEUTIC EXERCISE: (15 minutes):    Exercises per grid below to improve mobility, strength, balance, and coordination.   Required moderate visual, verbal, and manual cues to promote proper body alignment, promote proper body posture, and

## 2023-12-13 ENCOUNTER — HOSPITAL ENCOUNTER (OUTPATIENT)
Dept: PHYSICAL THERAPY | Age: 58
Setting detail: RECURRING SERIES
End: 2023-12-13
Payer: COMMERCIAL

## 2023-12-14 NOTE — PROGRESS NOTES
Amelie Francisco  : 1965  Primary: [employer]  Secondary:  [facility]  [address]  [address]  [facility] [address]  Phone: [phone]  Fax: [phone]    PT Visit Info:    Total # of Visits to Date: 1  Canceled Appointment: 1        OUTPATIENT THERAPY: 2023  Episode  Appt Desk        Amelie Francisco cancelled her appointment for today due to illness.  Will plan to follow up next during next appointment.  Thank you,  Ovi Dove, PT    Future Appointments   Date Time Provider Department Center   2023  9:45 AM [facility] PRN PROVIDER [facility]   2023  9:45 AM Ovi Dove, PT [facility]   2024  9:00 AM Ovi Dove, PT [facility]   1/3/2024 10:30 AM Ovi Dove, PT [facility]   2024  9:45 AM [facility] PRN PROVIDER [facility]   1/10/2024  9:45 AM Ovi Dove, PT [facility]

## 2023-12-21 ENCOUNTER — HOSPITAL ENCOUNTER (OUTPATIENT)
Dept: PHYSICAL THERAPY | Age: 58
Setting detail: RECURRING SERIES
End: 2023-12-21
Payer: COMMERCIAL

## 2023-12-27 ENCOUNTER — HOSPITAL ENCOUNTER (OUTPATIENT)
Dept: PHYSICAL THERAPY | Age: 58
Setting detail: RECURRING SERIES
Discharge: HOME | End: 2023-12-30
Payer: COMMERCIAL

## 2023-12-27 PROCEDURE — 97110 THERAPEUTIC EXERCISES: CPT

## 2023-12-28 NOTE — PROGRESS NOTES
Dean Triana Maryam  : 1965  Primary: [address] ([address])  Secondary:  [facility]  [address] Michi Carlson [address] [place] [id number]  Phone: [phone]  Fax: [phone] Plan Frequency: 2 times a week for 6 weeks    Plan of Care/Certification Expiration Date: 24 (start of care 23)      >PT Visit Info:  Plan Frequency: 2 times a week for 6 weeks  Plan of Care/Certification Expiration Date: 24 (start of care 23)  Total # of Visits to Date: 3  Canceled Appointment: 2      Visit Count:  3    OUTPATIENT PHYSICAL THERAPY:OP NOTE TYPE: Treatment Note 2023       Episode  }Appt Desk             Treatment Diagnosis:  Generalized Muscle Weakness (M62.81)  Difficulty in walking, Not elsewhere classified (R26.2)  Other Low Back Pain (M54.59)  Abnormal posture (R29.3)  Medical/Referring Diagnosis:  Low back pain, unspecified back pain laterality, unspecified chronicity, unspecified whether sciatica present [M54.50]  Spondylolisthesis of lumbar region [M43.16]  DDD (degenerative disc disease), lumbar [M51.36]  Facet arthropathy, lumbar [O06.002]  Referring Physician:  Dee Horvath MD Orders:  PT Eval and Treat   Date of Onset:  No data recorded   Allergies:   Penicillins, Penicillamine, and Pollen extract  Restrictions/Precautions:  None  Interventions Planned (Treatment may consist of any combination of the following):    Current Treatment Recommendations: Balance training; ROM; Strengthening; Functional mobility training; Transfer training; Neuromuscular re-education; Manual; Gait training; Endurance training; Home exercise program; Pain management; Safety education & training; Patient/Caregiver education & training; Modalities; Positioning; Dry needling; Therapeutic activities     >Subjective Comments: Patient reports mainly AM stiffness.    >Initial:     10>Post Session:       0/10  Medications Last Reviewed:  2023  Updated Objective Findings:   decreased

## 2024-01-02 ENCOUNTER — APPOINTMENT (OUTPATIENT)
Dept: PHYSICAL THERAPY | Age: 59
End: 2024-01-02
Payer: COMMERCIAL

## 2024-01-03 ENCOUNTER — HOSPITAL ENCOUNTER (OUTPATIENT)
Dept: PHYSICAL THERAPY | Age: 59
Setting detail: RECURRING SERIES
Discharge: HOME | End: 2024-01-06
Payer: COMMERCIAL

## 2024-01-03 PROCEDURE — 97110 THERAPEUTIC EXERCISES: CPT

## 2024-01-08 ENCOUNTER — HOSPITAL ENCOUNTER (OUTPATIENT)
Dept: PHYSICAL THERAPY | Age: 59
Setting detail: RECURRING SERIES
Discharge: HOME | End: 2024-01-11
Payer: COMMERCIAL

## 2024-01-08 PROCEDURE — 97110 THERAPEUTIC EXERCISES: CPT

## 2024-01-10 ENCOUNTER — HOSPITAL ENCOUNTER (OUTPATIENT)
Dept: PHYSICAL THERAPY | Age: 59
Setting detail: RECURRING SERIES
Discharge: HOME | End: 2024-01-13
Payer: COMMERCIAL

## 2024-01-10 PROCEDURE — 97110 THERAPEUTIC EXERCISES: CPT

## 2024-04-03 ENCOUNTER — APPOINTMENT (RX ONLY)
Dept: URBAN - METROPOLITAN AREA CLINIC 23 | Facility: CLINIC | Age: 59
Setting detail: DERMATOLOGY
End: 2024-04-03

## 2024-04-03 DIAGNOSIS — Z71.89 OTHER SPECIFIED COUNSELING: ICD-10-CM

## 2024-04-03 DIAGNOSIS — L57.8 OTHER SKIN CHANGES DUE TO CHRONIC EXPOSURE TO NONIONIZING RADIATION: ICD-10-CM | Status: INADEQUATELY CONTROLLED

## 2024-04-03 DIAGNOSIS — L71.8 OTHER ROSACEA: ICD-10-CM | Status: WELL CONTROLLED

## 2024-04-03 DIAGNOSIS — D485 NEOPLASM OF UNCERTAIN BEHAVIOR OF SKIN: ICD-10-CM

## 2024-04-03 DIAGNOSIS — D69.2 OTHER NONTHROMBOCYTOPENIC PURPURA: ICD-10-CM

## 2024-04-03 DIAGNOSIS — L57.0 ACTINIC KERATOSIS: ICD-10-CM

## 2024-04-03 DIAGNOSIS — L82.1 OTHER SEBORRHEIC KERATOSIS: ICD-10-CM

## 2024-04-03 PROBLEM — D48.5 NEOPLASM OF UNCERTAIN BEHAVIOR OF SKIN: Status: ACTIVE | Noted: 2024-04-03

## 2024-04-03 PROCEDURE — ? COUNSELING

## 2024-04-03 PROCEDURE — ? LIQUID NITROGEN

## 2024-04-03 PROCEDURE — ? PRESCRIPTION MEDICATION MANAGEMENT

## 2024-04-03 PROCEDURE — 17110 DESTRUCTION B9 LES UP TO 14: CPT

## 2024-04-03 PROCEDURE — 17003 DESTRUCT PREMALG LES 2-14: CPT | Mod: 59

## 2024-04-03 PROCEDURE — 11102 TANGNTL BX SKIN SINGLE LES: CPT | Mod: 59

## 2024-04-03 PROCEDURE — 99214 OFFICE O/P EST MOD 30 MIN: CPT | Mod: 25

## 2024-04-03 PROCEDURE — 17000 DESTRUCT PREMALG LESION: CPT | Mod: 59

## 2024-04-03 PROCEDURE — ? BIOPSY BY SHAVE METHOD

## 2024-04-03 ASSESSMENT — LOCATION ZONE DERM
LOCATION ZONE: FACE
LOCATION ZONE: LEG
LOCATION ZONE: NOSE
LOCATION ZONE: ARM

## 2024-04-03 ASSESSMENT — LOCATION DETAILED DESCRIPTION DERM
LOCATION DETAILED: LEFT POSTERIOR SHOULDER
LOCATION DETAILED: RIGHT ANTERIOR DISTAL THIGH
LOCATION DETAILED: LEFT INFERIOR MEDIAL MALAR CHEEK
LOCATION DETAILED: RIGHT DISTAL DORSAL FOREARM
LOCATION DETAILED: LEFT DISTAL RADIAL DORSAL FOREARM
LOCATION DETAILED: LEFT NASAL SIDEWALL
LOCATION DETAILED: LEFT DISTAL PRETIBIAL REGION
LOCATION DETAILED: RIGHT CENTRAL MALAR CHEEK
LOCATION DETAILED: LEFT DISTAL POSTERIOR THIGH
LOCATION DETAILED: RIGHT POSTERIOR SHOULDER
LOCATION DETAILED: RIGHT PROXIMAL PRETIBIAL REGION
LOCATION DETAILED: LEFT INFERIOR CENTRAL MALAR CHEEK
LOCATION DETAILED: RIGHT DISTAL PRETIBIAL REGION
LOCATION DETAILED: RIGHT MEDIAL TEMPLE

## 2024-04-03 ASSESSMENT — LOCATION SIMPLE DESCRIPTION DERM
LOCATION SIMPLE: RIGHT SHOULDER
LOCATION SIMPLE: LEFT POSTERIOR THIGH
LOCATION SIMPLE: RIGHT PRETIBIAL REGION
LOCATION SIMPLE: LEFT CHEEK
LOCATION SIMPLE: RIGHT CHEEK
LOCATION SIMPLE: RIGHT THIGH
LOCATION SIMPLE: LEFT FOREARM
LOCATION SIMPLE: LEFT NOSE
LOCATION SIMPLE: RIGHT FOREARM
LOCATION SIMPLE: LEFT SHOULDER
LOCATION SIMPLE: RIGHT TEMPLE
LOCATION SIMPLE: LEFT PRETIBIAL REGION

## 2024-04-03 NOTE — PROCEDURE: PRESCRIPTION MEDICATION MANAGEMENT
Continue Regimen: Doxy 50mg capsule prn instead of 100mg. Patient's state plan will not cover Doxy 100mg anymore. 50mg is preferred\\nTriple rosacea cream through [facility]
Detail Level: Zone
Render In Strict Bullet Format?: No

## 2024-04-03 NOTE — PROCEDURE: LIQUID NITROGEN
Post-Care Instructions: I reviewed with the patient in detail post-care instructions. Patient is to wear sunprotection, and avoid picking at any of the treated lesions. Pt may apply Vaseline to crusted or scabbing areas.
Consent: The patient's consent was obtained including but not limited to risks of crusting, scabbing, blistering, scarring, darker or lighter pigmentary change, recurrence, incomplete removal and infection.
Show Aperture Variable?: Yes
Detail Level: Detailed
Number Of Freeze-Thaw Cycles: 2 freeze-thaw cycles
Render Post-Care Instructions In Note?: no
Aperture Size (Optional): C
Application Tool (Optional): Liquid Nitrogen Sprayer
Duration Of Freeze Thaw-Cycle (Seconds): 0
Number Of Freeze-Thaw Cycles: 3 freeze-thaw cycles
Medical Necessity Clause: This procedure was medically necessary because the lesions that were treated were:
Spray Paint Text: The liquid nitrogen was applied to the skin utilizing a spray paint frosting technique.
Medical Necessity Information: It is in your best interest to select a reason for this procedure from the list below. All of these items fulfill various CMS LCD requirements except the new and changing color options.
Application Tool (Optional): Cry-AC

## 2024-04-03 NOTE — PROCEDURE: BIOPSY BY SHAVE METHOD
Detail Level: Detailed
Depth Of Biopsy: dermis
Was A Bandage Applied: Yes
Size Of Lesion In Cm: 0
Biopsy Type: H and E
Biopsy Method: double edge Personna blade
Anesthesia Type: 1% lidocaine without epinephrine and a 1:12 solution of 8.4% sodium bicarbonate
Anesthesia Volume In Cc: 0.5
Hemostasis: Aluminum Chloride
Wound Care: Petrolatum
Dressing: bandage
Destruction After The Procedure: No
Type Of Destruction Used: Curettage
Curettage Text: The wound bed was treated with curettage after the biopsy was performed.
Cryotherapy Text: The wound bed was treated with cryotherapy after the biopsy was performed.
Electrodesiccation Text: The wound bed was treated with electrodesiccation after the biopsy was performed.
Electrodesiccation And Curettage Text: The wound bed was treated with electrodesiccation and curettage after the biopsy was performed.
Silver Nitrate Text: The wound bed was treated with silver nitrate after the biopsy was performed.
Lab: [id number]
Lab Facility: [id number]
Consent: Written consent was obtained and risks were reviewed including but not limited to scarring, infection, bleeding, scabbing, incomplete removal, nerve damage and allergy to anesthesia.
Post-Care Instructions: I reviewed with the patient in detail post-care instructions. Patient is to keep the biopsy site dry overnight, and then apply bacitracin twice daily until healed. Patient may apply hydrogen peroxide soaks to remove any crusting.
Notification Instructions: Patient will be notified of biopsy results. However, patient instructed to call the office if not contacted within 2 weeks.
Billing Type: Third-Party Bill
Information: Selecting Yes will display possible errors in your note based on the variables you have selected. This validation is only offered as a suggestion for you. PLEASE NOTE THAT THE VALIDATION TEXT WILL BE REMOVED WHEN YOU FINALIZE YOUR NOTE. IF YOU WANT TO FAX A PRELIMINARY NOTE YOU WILL NEED TO TOGGLE THIS TO 'NO' IF YOU DO NOT WANT IT IN YOUR FAXED NOTE.

## 2024-06-20 ENCOUNTER — APPOINTMENT (RX ONLY)
Dept: URBAN - METROPOLITAN AREA CLINIC 24 | Facility: CLINIC | Age: 59
Setting detail: DERMATOLOGY
End: 2024-06-20

## 2024-06-20 DIAGNOSIS — L57.0 ACTINIC KERATOSIS: ICD-10-CM | Status: RESOLVED

## 2024-06-20 DIAGNOSIS — L82.1 OTHER SEBORRHEIC KERATOSIS: ICD-10-CM

## 2024-06-20 PROCEDURE — ? LIQUID NITROGEN

## 2024-06-20 PROCEDURE — 99213 OFFICE O/P EST LOW 20 MIN: CPT | Mod: 25

## 2024-06-20 PROCEDURE — ? COUNSELING

## 2024-06-20 PROCEDURE — 17110 DESTRUCTION B9 LES UP TO 14: CPT

## 2024-06-20 ASSESSMENT — LOCATION DETAILED DESCRIPTION DERM
LOCATION DETAILED: LEFT DISTAL DORSAL FOREARM
LOCATION DETAILED: RIGHT ANTERIOR DISTAL THIGH
LOCATION DETAILED: RIGHT MEDIAL DISTAL PRETIBIAL REGION
LOCATION DETAILED: RIGHT MEDIAL PROXIMAL PRETIBIAL REGION
LOCATION DETAILED: LEFT DISTAL PRETIBIAL REGION
LOCATION DETAILED: RIGHT PROXIMAL PRETIBIAL REGION

## 2024-06-20 ASSESSMENT — LOCATION SIMPLE DESCRIPTION DERM
LOCATION SIMPLE: RIGHT THIGH
LOCATION SIMPLE: RIGHT PRETIBIAL REGION
LOCATION SIMPLE: LEFT PRETIBIAL REGION
LOCATION SIMPLE: LEFT FOREARM

## 2024-06-20 ASSESSMENT — LOCATION ZONE DERM
LOCATION ZONE: LEG
LOCATION ZONE: ARM

## 2024-06-20 NOTE — PROCEDURE: LIQUID NITROGEN
Number Of Freeze-Thaw Cycles: 3 freeze-thaw cycles
Show Spray Paint Technique Variable?: Yes
Include Z78.9 (Other Specified Conditions Influencing Health Status) As An Associated Diagnosis?: No
Spray Paint Text: The liquid nitrogen was applied to the skin utilizing a spray paint frosting technique.
Post-Care Instructions: I reviewed with the patient in detail post-care instructions. Patient is to wear sunprotection, and avoid picking at any of the treated lesions. Pt may apply Vaseline to crusted or scabbing areas.
Consent: The patient's consent was obtained including but not limited to risks of crusting, scabbing, blistering, scarring, darker or lighter pigmentary change, recurrence, incomplete removal and infection.
Medical Necessity Clause: This procedure was medically necessary because the lesions that were treated were:
Application Tool (Optional): Cry-AC
Medical Necessity Information: It is in your best interest to select a reason for this procedure from the list below. All of these items fulfill various CMS LCD requirements except the new and changing color options.
Detail Level: Detailed
Aperture Size (Optional): C

## 2025-04-03 ENCOUNTER — APPOINTMENT (OUTPATIENT)
Dept: URBAN - METROPOLITAN AREA CLINIC 24 | Facility: CLINIC | Age: 60
Setting detail: DERMATOLOGY
End: 2025-04-03

## 2025-04-03 DIAGNOSIS — D485 NEOPLASM OF UNCERTAIN BEHAVIOR OF SKIN: ICD-10-CM

## 2025-04-03 DIAGNOSIS — L91.8 OTHER HYPERTROPHIC DISORDERS OF THE SKIN: ICD-10-CM

## 2025-04-03 DIAGNOSIS — D69.2 OTHER NONTHROMBOCYTOPENIC PURPURA: ICD-10-CM

## 2025-04-03 DIAGNOSIS — L71.8 OTHER ROSACEA: ICD-10-CM | Status: INADEQUATELY CONTROLLED

## 2025-04-03 DIAGNOSIS — L57.8 OTHER SKIN CHANGES DUE TO CHRONIC EXPOSURE TO NONIONIZING RADIATION: ICD-10-CM | Status: INADEQUATELY CONTROLLED

## 2025-04-03 DIAGNOSIS — L82.1 OTHER SEBORRHEIC KERATOSIS: ICD-10-CM

## 2025-04-03 DIAGNOSIS — L57.0 ACTINIC KERATOSIS: ICD-10-CM

## 2025-04-03 DIAGNOSIS — B07.8 OTHER VIRAL WARTS: ICD-10-CM

## 2025-04-03 DIAGNOSIS — Z71.89 OTHER SPECIFIED COUNSELING: ICD-10-CM

## 2025-04-03 PROBLEM — D48.5 NEOPLASM OF UNCERTAIN BEHAVIOR OF SKIN: Status: ACTIVE | Noted: 2025-04-03

## 2025-04-03 PROCEDURE — ? PRESCRIPTION MEDICATION MANAGEMENT

## 2025-04-03 PROCEDURE — ? BIOPSY BY SHAVE METHOD

## 2025-04-03 PROCEDURE — 17000 DESTRUCT PREMALG LESION: CPT | Mod: 59

## 2025-04-03 PROCEDURE — 17110 DESTRUCTION B9 LES UP TO 14: CPT

## 2025-04-03 PROCEDURE — ? PRESCRIPTION

## 2025-04-03 PROCEDURE — ? LIQUID NITROGEN

## 2025-04-03 PROCEDURE — 11102 TANGNTL BX SKIN SINGLE LES: CPT | Mod: 59

## 2025-04-03 PROCEDURE — ? COUNSELING

## 2025-04-03 PROCEDURE — 17003 DESTRUCT PREMALG LES 2-14: CPT | Mod: 59

## 2025-04-03 PROCEDURE — 99214 OFFICE O/P EST MOD 30 MIN: CPT | Mod: 25

## 2025-04-03 ASSESSMENT — LOCATION SIMPLE DESCRIPTION DERM
LOCATION SIMPLE: RIGHT UPPER BACK
LOCATION SIMPLE: LEFT PRETIBIAL REGION
LOCATION SIMPLE: LEFT ANTERIOR NECK
LOCATION SIMPLE: RIGHT FOREHEAD
LOCATION SIMPLE: RIGHT ANTERIOR NECK
LOCATION SIMPLE: RIGHT FOREARM
LOCATION SIMPLE: RIGHT SHOULDER
LOCATION SIMPLE: LEFT CHEEK
LOCATION SIMPLE: LEFT SHOULDER
LOCATION SIMPLE: LEFT NOSE
LOCATION SIMPLE: LEFT FOREARM
LOCATION SIMPLE: RIGHT CHEEK

## 2025-04-03 ASSESSMENT — LOCATION DETAILED DESCRIPTION DERM
LOCATION DETAILED: RIGHT CLAVICULAR NECK
LOCATION DETAILED: RIGHT INFERIOR LATERAL UPPER BACK
LOCATION DETAILED: LEFT INFERIOR CENTRAL MALAR CHEEK
LOCATION DETAILED: LEFT POSTERIOR SHOULDER
LOCATION DETAILED: RIGHT POSTERIOR SHOULDER
LOCATION DETAILED: RIGHT FOREHEAD
LOCATION DETAILED: LEFT CLAVICULAR NECK
LOCATION DETAILED: LEFT NASAL SIDEWALL
LOCATION DETAILED: LEFT PROXIMAL DORSAL FOREARM
LOCATION DETAILED: RIGHT DISTAL DORSAL FOREARM
LOCATION DETAILED: RIGHT CENTRAL MALAR CHEEK
LOCATION DETAILED: LEFT DISTAL PRETIBIAL REGION

## 2025-04-03 ASSESSMENT — LOCATION ZONE DERM
LOCATION ZONE: NECK
LOCATION ZONE: LEG
LOCATION ZONE: TRUNK
LOCATION ZONE: FACE
LOCATION ZONE: ARM
LOCATION ZONE: NOSE

## 2025-04-03 NOTE — PROCEDURE: LIQUID NITROGEN
Show Applicator Variable?: Yes
Aperture Size (Optional): C
Duration Of Freeze Thaw-Cycle (Seconds): 0
Render Post-Care Instructions In Note?: no
Number Of Freeze-Thaw Cycles: 2 freeze-thaw cycles
Post-Care Instructions: I reviewed with the patient in detail post-care instructions. Patient is to wear sunprotection, and avoid picking at any of the treated lesions. Pt may apply Vaseline to crusted or scabbing areas.
Consent: The patient's consent was obtained including but not limited to risks of crusting, scabbing, blistering, scarring, darker or lighter pigmentary change, recurrence, incomplete removal and infection.
Application Tool (Optional): Liquid Nitrogen Sprayer
Detail Level: Detailed
Application Tool (Optional): Cry-AC
Medical Necessity Clause: This procedure was medically necessary because the lesions that were treated were:
Medical Necessity Information: It is in your best interest to select a reason for this procedure from the list below. All of these items fulfill various CMS LCD requirements except the new and changing color options.
Spray Paint Text: The liquid nitrogen was applied to the skin utilizing a spray paint frosting technique.
Number Of Freeze-Thaw Cycles: 3 freeze-thaw cycles

## 2025-04-03 NOTE — PROCEDURE: BIOPSY BY SHAVE METHOD
Detail Level: Detailed
Depth Of Biopsy: dermis
Was A Bandage Applied: Yes
Size Of Lesion In Cm: 0
Biopsy Type: H and E
Biopsy Method: double edge Personna blade
Anesthesia Type: 1% lidocaine without epinephrine and a 1:12 solution of 8.4% sodium bicarbonate
Anesthesia Volume In Cc: 0.5
Hemostasis: Aluminum Chloride and Electrocautery
Wound Care: Petrolatum
Dressing: bandage
Destruction After The Procedure: No
Type Of Destruction Used: Curettage
Curettage Text: The wound bed was treated with curettage after the biopsy was performed.
Cryotherapy Text: The wound bed was treated with cryotherapy after the biopsy was performed.
Electrodesiccation Text: The wound bed was treated with electrodesiccation after the biopsy was performed.
Electrodesiccation And Curettage Text: The wound bed was treated with electrodesiccation and curettage after the biopsy was performed.
Silver Nitrate Text: The wound bed was treated with silver nitrate after the biopsy was performed.
Lab: [id number]
Lab Facility: [id number]
Medical Necessity Information: It is in your best interest to select a reason for this procedure from the list below. All of these items fulfill various CMS LCD requirements except the new and changing color options.
Consent: Written consent was obtained and risks were reviewed including but not limited to scarring, infection, bleeding, scabbing, incomplete removal, nerve damage and allergy to anesthesia.
Post-Care Instructions: I reviewed with the patient in detail post-care instructions. Patient is to keep the biopsy site dry overnight, and then apply bacitracin twice daily until healed. Patient may apply hydrogen peroxide soaks to remove any crusting.
Notification Instructions: Patient will be notified of biopsy results. However, patient instructed to call the office if not contacted within 2 weeks.
Billing Type: Third-Party Bill
Information: Selecting Yes will display possible errors in your note based on the variables you have selected. This validation is only offered as a suggestion for you. PLEASE NOTE THAT THE VALIDATION TEXT WILL BE REMOVED WHEN YOU FINALIZE YOUR NOTE. IF YOU WANT TO FAX A PRELIMINARY NOTE YOU WILL NEED TO TOGGLE THIS TO 'NO' IF YOU DO NOT WANT IT IN YOUR FAXED NOTE.